# Patient Record
Sex: MALE | Race: WHITE | Employment: FULL TIME | URBAN - METROPOLITAN AREA
[De-identification: names, ages, dates, MRNs, and addresses within clinical notes are randomized per-mention and may not be internally consistent; named-entity substitution may affect disease eponyms.]

---

## 2020-08-31 ENCOUNTER — APPOINTMENT (OUTPATIENT)
Dept: GENERAL RADIOLOGY | Age: 20
End: 2020-08-31

## 2020-08-31 ENCOUNTER — HOSPITAL ENCOUNTER (EMERGENCY)
Age: 20
Discharge: HOME | End: 2020-08-31

## 2020-08-31 VITALS
BODY MASS INDEX: 41.18 KG/M2 | SYSTOLIC BLOOD PRESSURE: 137 MMHG | OXYGEN SATURATION: 96 % | WEIGHT: 304.01 LBS | HEIGHT: 72 IN | RESPIRATION RATE: 20 BRPM | DIASTOLIC BLOOD PRESSURE: 93 MMHG | HEART RATE: 114 BPM | TEMPERATURE: 97.3 F

## 2020-08-31 PROCEDURE — 70160 X-RAY EXAM OF NASAL BONES: CPT

## 2020-08-31 PROCEDURE — 6370000000 HC RX 637 (ALT 250 FOR IP): Performed by: PHYSICIAN ASSISTANT

## 2020-08-31 PROCEDURE — 99283 EMERGENCY DEPT VISIT LOW MDM: CPT

## 2020-08-31 RX ORDER — BACITRACIN, NEOMYCIN, POLYMYXIN B 400; 3.5; 5 [USP'U]/G; MG/G; [USP'U]/G
OINTMENT TOPICAL ONCE
Status: COMPLETED | OUTPATIENT
Start: 2020-08-31 | End: 2020-08-31

## 2020-08-31 RX ORDER — ECHINACEA PURPUREA EXTRACT 125 MG
1 TABLET ORAL PRN
Qty: 1 BOTTLE | Refills: 0 | Status: SHIPPED | OUTPATIENT
Start: 2020-08-31

## 2020-08-31 RX ORDER — IBUPROFEN 600 MG/1
600 TABLET ORAL ONCE
Status: COMPLETED | OUTPATIENT
Start: 2020-08-31 | End: 2020-08-31

## 2020-08-31 RX ORDER — IBUPROFEN 600 MG/1
600 TABLET ORAL EVERY 6 HOURS PRN
Qty: 30 TABLET | Refills: 0 | Status: SHIPPED | OUTPATIENT
Start: 2020-08-31

## 2020-08-31 RX ADMIN — BACITRACIN ZINC, NEOMYCIN SULFATE, AND POLYMYXIN B SULFATE: 400; 3.5; 5 OINTMENT TOPICAL at 20:32

## 2020-08-31 RX ADMIN — IBUPROFEN 600 MG: 600 TABLET, FILM COATED ORAL at 20:31

## 2020-08-31 ASSESSMENT — PAIN DESCRIPTION - FREQUENCY: FREQUENCY: CONTINUOUS

## 2020-08-31 ASSESSMENT — PAIN SCALES - GENERAL
PAINLEVEL_OUTOF10: 2

## 2020-08-31 ASSESSMENT — ENCOUNTER SYMPTOMS: FACIAL SWELLING: 1

## 2020-08-31 ASSESSMENT — PAIN - FUNCTIONAL ASSESSMENT: PAIN_FUNCTIONAL_ASSESSMENT: 0-10

## 2020-08-31 ASSESSMENT — PAIN DESCRIPTION - DESCRIPTORS: DESCRIPTORS: THROBBING

## 2020-08-31 ASSESSMENT — PAIN DESCRIPTION - PAIN TYPE: TYPE: ACUTE PAIN

## 2020-08-31 ASSESSMENT — PAIN DESCRIPTION - LOCATION: LOCATION: NOSE

## 2020-08-31 NOTE — ED NOTES
Small cut noticed on left side of bridge of nose. Cleansed with antiseptic soap and saline. Pt tolerated well. Bruising noted over left eye lid.      Sara Guillermo RN  08/31/20 2419

## 2020-08-31 NOTE — LETTER
[facility] [address]  Phone: [phone]               August 31, 2020    Patient: Zev Baxter   YOB: 2000   Date of Visit: 8/31/2020       To Whom It May Concern:    Zev Baxter was seen and treated in our emergency department on 8/31/2020. He may return to work on 09/02/2020.       Sincerely,       Ning Nieto RN          Signature:__________________________________

## 2020-08-31 NOTE — ED NOTES
Walked pt from [facility] to ED bed. Obtained VS. Pt wearing mask, medic wearing mask, gloves, safety glasses.      Debbi Simons, EMT-P  08/31/20 3444

## 2020-09-01 ENCOUNTER — TELEPHONE (OUTPATIENT)
Dept: ENT CLINIC | Age: 20
End: 2020-09-01

## 2020-09-01 NOTE — TELEPHONE ENCOUNTER
----- Message from Demetria Zelaya MD sent at 9/1/2020  8:39 AM EDT -----  Regarding: appointment  This week for a possible nasal bone fracture.     Thanks,  Ara Ledezma

## 2020-09-01 NOTE — ED PROVIDER NOTES
[facility] ENCOUNTER      Pt Name: Usman Adkins  MRN: [id number]  [place] 2000  Date of evaluation: 8/31/2020  Provider: Liberty Castle PA-C    This patient was not seen and evaluated by the attending physician No att. providers found. CHIEF COMPLAINT       Chief Complaint   Patient presents with    Facial Injury     pt took an elbow to the nose while playing basketball 30 minutes pta. pain 2/10, worse to touch, swelling, not bleeding currently. HISTORYOF PRESENT ILLNESS  (Location/Symptom, Timing/Onset, Context/Setting, Quality, Duration, Modifying Factors, Severity.)   Usman Adkins is a 21 y.o. male who presents to the emergency department with nose pain. 30 minutes ago while playing basketball he was struck in the nose by someone's elbow. He was not knocked unconscious. He reports pain, swelling, epistaxis and an abrasion. Pain is constant and worse to the touch. It is rated 2 out of 10. Nothing makes it better. He is taken nothing for it. Epistaxis has resolved. Nursing Notes were reviewedand I agree. REVIEW OF SYSTEMS    (2-9 systems for level 4, 10 or more forlevel 5)     Review of Systems   Constitutional: Negative for fever. HENT: Positive for facial swelling. Skin: Positive for wound. Neurological: Negative for dizziness, syncope and headaches. Except as noted above the remainder ofthe review of systems was reviewed and negative. PAST MEDICALHISTORY   History reviewed. No pertinent past medical history. SURGICAL HISTORY     History reviewed. No pertinent surgical history. CURRENT MEDICATIONS       Previous Medications    No medications on file       ALLERGIES     Patient has no known allergies. FAMILY HISTORY     History reviewed. No pertinent family history. No family status information on file. SOCIAL HISTORY    reports that he is a non-smoker but has been exposed to tobacco smoke.  He has never used smokeless tobacco. He reports current alcohol use. He reports that he does not use drugs. PHYSICAL EXAM    (up to 7 for level 4, 8 or more for level 5)     ED Triage Vitals [08/31/20 1935]   BP Temp Temp Source Pulse Resp SpO2 Height Weight   (!) 137/93 97.3 °F (36.3 °C) Infrared 114 20 96 % 6' (1.829 m) (!) 304 lb 0.2 oz (137.9 kg)       Physical Exam  Vitals signs and nursing note reviewed. Constitutional:       General: He is not in acute distress. Appearance: He is well-developed. HENT:      Head: Normocephalic. Comments: No septal hematoma     Nose: Signs of injury (swelling and tenderness proximal nose) present. No septal deviation. Neck:      Musculoskeletal: Neck supple. Pulmonary:      Effort: Pulmonary effort is normal. No respiratory distress. Musculoskeletal: Normal range of motion. Skin:     General: Skin is warm and dry. Neurological:      Mental Status: He is alert and oriented to person, place, and time. Psychiatric:         Behavior: Behavior normal.            DIAGNOSTIC RESULTS     RADIOLOGY:   Non-plain film images such as CT, Ultrasound and MRI are read by the radiologist.Plain radiographic images are visualized and preliminarily interpreted by Tracy Hopkins PA-C with the below findings:        Interpretation per the Radiologist below, if available at the time of this note:    XR NASAL BONE (MIN 3 VIEWS )   Final Result   Acute nasal fracture. LABS:  Labs Reviewed - No data to display    All other labs were within normal range or not returned as of this dictation. EMERGENCY DEPARTMENT COURSE and DIFFERENTIAL DIAGNOSIS/MDM:   Vitals:    Vitals:    08/31/20 1935   BP: (!) 137/93   Pulse: 114   Resp: 20   Temp: 97.3 °F (36.3 °C)   TempSrc: Infrared   SpO2: 96%   Weight: (!) 304 lb 0.2 oz (137.9 kg)   Height: 6' (1.829 m)        I have evaluated this patient. My supervising physician was available for consultation.  No septal hematoma or active epistaxis. Wound is superficial and was cleaned, polysporin applied. He was told to ice frequently, use nasal saline, avoid blowing the nose and ibuprofen 600 mg was prescribed. He was referred to ENT should he have residual deformity or other concerns. Discussed results, diagnosis and plan with patient and/or family. Questions addressed. Dispositionand follow-up agreed upon. Specific discharge instructions explained. The patient and/or family and I have discussed the diagnosis and risks, and we agree with discharging home to follow-up with their primary care,specialist or referral doctor. We also discussed returning to the Emergency Department immediately if new or worsening symptoms occur. We have discussed the symptoms which are most concerning that necessitate immediatereturn. PROCEDURES:  None    FINAL IMPRESSION      1. Closed fracture of nasal bone, initial encounter    2.  Abrasion          DISPOSITION/PLAN   DISPOSITION Decision To Discharge 08/31/2020 08:16:05 PM      PATIENT REFERRED TO:  [facility] [address]  [address] [facility] [address] [place] [address]  [phone]    Schedule an appointment as soon as possible for a visit       [place] [address]  [phone]    If symptoms worsen      MEDICATIONS:  New Prescriptions    SODIUM CHLORIDE (OCEAN) 0.65 % NASAL SPRAY    1 spray by Nasal route as needed for Congestion       (Please note that portions of this note were completed with a voice recognition program.  Efforts were made toedit the dictations but occasionally words are mis-transcribed.)    VICKEY Harrell PA-C  08/31/20 2021

## 2020-09-01 NOTE — ED NOTES
Bacitracin applied over small wound on left side of the nose bridge. Pt tolerated well. Verbal Instructions on how to care for the wound given at this time.       Amarilis Tavares RN  08/31/20 2041

## 2025-03-15 ENCOUNTER — HOSPITAL ENCOUNTER (EMERGENCY)
Age: 25
Discharge: HOME | End: 2025-03-15
Payer: COMMERCIAL

## 2025-03-15 VITALS
HEART RATE: 66 BPM | RESPIRATION RATE: 16 BRPM | TEMPERATURE: 98.4 F | WEIGHT: 315 LBS | BODY MASS INDEX: 42.66 KG/M2 | HEIGHT: 72 IN | DIASTOLIC BLOOD PRESSURE: 103 MMHG | SYSTOLIC BLOOD PRESSURE: 157 MMHG | OXYGEN SATURATION: 98 %

## 2025-03-15 DIAGNOSIS — K04.7 DENTAL ABSCESS: Primary | ICD-10-CM

## 2025-03-15 PROCEDURE — 99283 EMERGENCY DEPT VISIT LOW MDM: CPT

## 2025-03-15 PROCEDURE — 6370000000 HC RX 637 (ALT 250 FOR IP): Performed by: PHYSICIAN ASSISTANT

## 2025-03-15 RX ORDER — IBUPROFEN 600 MG/1
600 TABLET, FILM COATED ORAL ONCE
Status: COMPLETED | OUTPATIENT
Start: 2025-03-15 | End: 2025-03-15

## 2025-03-15 RX ORDER — AMOXICILLIN 500 MG/1
500 CAPSULE ORAL 3 TIMES DAILY
Qty: 21 CAPSULE | Refills: 0 | Status: SHIPPED | OUTPATIENT
Start: 2025-03-15 | End: 2025-03-22

## 2025-03-15 RX ORDER — IBUPROFEN 600 MG/1
600 TABLET, FILM COATED ORAL
Qty: 30 TABLET | Refills: 0 | Status: SHIPPED | OUTPATIENT
Start: 2025-03-15

## 2025-03-15 RX ORDER — AMOXICILLIN 250 MG/1
1000 CAPSULE ORAL ONCE
Status: COMPLETED | OUTPATIENT
Start: 2025-03-15 | End: 2025-03-15

## 2025-03-15 RX ADMIN — IBUPROFEN 600 MG: 600 TABLET, FILM COATED ORAL at 18:02

## 2025-03-15 RX ADMIN — AMOXICILLIN 1000 MG: 250 CAPSULE ORAL at 18:02

## 2025-03-15 ASSESSMENT — PAIN DESCRIPTION - DESCRIPTORS
DESCRIPTORS: ACHING
DESCRIPTORS: ACHING

## 2025-03-15 ASSESSMENT — PAIN DESCRIPTION - LOCATION
LOCATION: TEETH
LOCATION: MOUTH

## 2025-03-15 ASSESSMENT — PAIN - FUNCTIONAL ASSESSMENT: PAIN_FUNCTIONAL_ASSESSMENT: 0-10

## 2025-03-15 ASSESSMENT — PAIN DESCRIPTION - ORIENTATION
ORIENTATION: RIGHT;UPPER
ORIENTATION: RIGHT

## 2025-03-15 ASSESSMENT — PAIN DESCRIPTION - PAIN TYPE: TYPE: ACUTE PAIN

## 2025-03-15 ASSESSMENT — LIFESTYLE VARIABLES
HOW MANY STANDARD DRINKS CONTAINING ALCOHOL DO YOU HAVE ON A TYPICAL DAY: PATIENT DOES NOT DRINK
HOW OFTEN DO YOU HAVE A DRINK CONTAINING ALCOHOL: NEVER

## 2025-03-15 ASSESSMENT — PAIN SCALES - GENERAL
PAINLEVEL_OUTOF10: 6
PAINLEVEL_OUTOF10: 10

## 2025-03-15 ASSESSMENT — PAIN DESCRIPTION - FREQUENCY: FREQUENCY: CONTINUOUS

## 2025-03-15 NOTE — ED NOTES
D/C: Order noted for d/c. Pt confirmed d/c paperwork has correct name. Discharge and education instructions reviewed with patient. Teach-back successful.  Pt verbalized understanding and denied questions at this time. No acute distress noted. Patient instructed to follow-up as noted - return to emergency department if symptoms worsen. Patient verbalized understanding. Discharged per EDMD with discharge instructions. Pt discharged to private vehicle. Patient stable upon departure. Thanked patient for [facility] for care. Provider aware of patient pain at time of discharge.

## 2025-03-15 NOTE — DISCHARGE INSTRUCTIONS
Take medication as prescribed.  I have prescribed anti-inflammatory ibuprofen.  With a gated Tylenol 1000 mg 3 times a day with each dose of the ibuprofen.  First dose antibiotic given in the emergency room and prescription given at discharge.  These can be filled at any pharmacy.  Salt water rinse may benefit.  Follow-up with dentist on Wednesday as scheduled.

## 2025-03-15 NOTE — ED PROVIDER NOTES
[facility] EMERGENCY DEPARTMENT  EMERGENCY DEPARTMENT ENCOUNTER        Pt Name: Erasmo Aparicio  MRN: [id number]  Birthdate 2000  Date of evaluation: 3/15/2025  Provider: Arnoldo Miller PA-C  PCP: No primary care provider on file.  Note Started: 5:54 PM EDT 3/15/25      GATO. I have evaluated this patient.        CHIEF COMPLAINT       Chief Complaint   Patient presents with    Abscess     Pt from home c/o abscess on R gum x2 weeks. Pt states he got medical/dental insurance last week but was to use it due to an error that he is trying to resolve. Pain rated 10/10.       HISTORY OF PRESENT ILLNESS: 1 or more Elements     History From: Patient    Erasmo Aparicio is a 24 y.o. male who presents to the Emergency Department with 1-2-week history of general dental pain more on the right upper.  Small mobile noted on the gingiva for about 1 week.  Not progressive.  Slight increase in pain.  He is naturally bad teeth.  He had an appointment yesterday at the dentist however they cannot pull up his insurance he rescheduled for this coming Wednesday.  He took Tylenol earlier today.  He has had no antibiotic or ibuprofen.    Nursing Notes were all reviewed and agreed with or any disagreements were addressed in the HPI.    REVIEW OF SYSTEMS :      Review of Systems    Positives and Pertinent negatives as per HPI.     SURGICAL HISTORY   History reviewed. No pertinent surgical history.    CURRENTMEDICATIONS       Previous Medications    SODIUM CHLORIDE (OCEAN) 0.65 % NASAL SPRAY    1 spray by Nasal route as needed for Congestion       ALLERGIES     Patient has no known allergies.    FAMILYHISTORY     History reviewed. No pertinent family history.     SOCIAL HISTORY       Social History     Tobacco Use    Smoking status: Passive Smoke Exposure - Never Smoker    Smokeless tobacco: Never   Substance Use Topics    Alcohol use: Yes    Drug use: No       SCREENINGS     NIHSS:     GCS: Melbeta Coma Scale  Eye Opening: